# Patient Record
Sex: FEMALE | Race: BLACK OR AFRICAN AMERICAN | Employment: FULL TIME | URBAN - METROPOLITAN AREA
[De-identification: names, ages, dates, MRNs, and addresses within clinical notes are randomized per-mention and may not be internally consistent; named-entity substitution may affect disease eponyms.]

---

## 2020-07-22 ENCOUNTER — HOSPITAL ENCOUNTER (EMERGENCY)
Age: 50
Discharge: HOME | End: 2020-07-22
Payer: COMMERCIAL

## 2020-07-22 VITALS
BODY MASS INDEX: 23.32 KG/M2 | SYSTOLIC BLOOD PRESSURE: 144 MMHG | DIASTOLIC BLOOD PRESSURE: 102 MMHG | TEMPERATURE: 98.3 F | WEIGHT: 140 LBS | RESPIRATION RATE: 15 BRPM | HEIGHT: 65 IN | OXYGEN SATURATION: 100 % | HEART RATE: 80 BPM

## 2020-07-22 PROCEDURE — 99283 EMERGENCY DEPT VISIT LOW MDM: CPT

## 2020-07-23 NOTE — ED PROVIDER NOTES
file    Stress: Not on file   Relationships    Social connections     Talks on phone: Not on file     Gets together: Not on file     Attends [employer] service: Not on file     Active member of club or organization: Not on file     Attends meetings of clubs or organizations: Not on file     Relationship status: Not on file    Intimate partner violence     Fear of current or ex partner: Not on file     Emotionally abused: Not on file     Physically abused: Not on file     Forced sexual activity: Not on file   Other Topics Concern    Not on file   Social History Narrative    Not on file     No current facility-administered medications for this encounter. Current Outpatient Medications   Medication Sig Dispense Refill    lisinopril (PRINIVIL;ZESTRIL) 40 MG tablet Take 40 mg by mouth daily.  amlodipine (NORVASC) 5 MG tablet Take 10 mg by mouth daily. No Known Allergies    REVIEW OF SYSTEMS:  6 systems reviewed, pertinent positives per HPI otherwise noted to be negative. PHYSICAL EXAM:  BP (!) 144/102   Pulse 80   Temp 98.3 °F (36.8 °C) (Oral)   Resp 15   Ht 5' 5\" (1.651 m)   Wt 140 lb (63.5 kg)   SpO2 100%   BMI 23.30 kg/m²   CONSTITUTIONAL: Awake and alert. Well-developed. Well-nourished. Non-toxic. Cooperative. No acute distress. HENT: Normocephalic. Atraumatic. External ears normal, without discharge. Nose normal. Mucous membranes moist.  EYES: Conjunctiva non-injected. No scleral icterus. PERRL. EOM's grossly intact. NECK: Supple. Normal ROM. CARDIOVASCULAR: Normal heart rate. Intact distal pulses. PULMONARY/CHEST WALL: Breathing is unlabored. Equal, symmetric chest rise. Speaking comfortably in full sentences. ABDOMEN: Nondistended  MUSKULOSKELETAL: Normal ROM. No acute deformities. SKIN: Warm and dry. There are 2 small, very superficial bite wounds to the left distal pretibial region. Each wound is just under 1 cm in length. There is no bleeding.   There is no soft tissue swelling. No significant pain to palpate the wounds. No crepitus. See picture below. NEUROLOGICAL: Alert and oriented x 3. Strength is 5/5 in all extremities and sensation is intact. PSYCHIATRIC: Normal affect        Left distal pretibial region            Labs:    None    RADIOLOGY:    None        ED COURSE/MDM:  Patient was given the following medications: None      I have evaluated this patient here in the ED. this patient has 2 very superficial bite wounds to the left lower leg. There appears to be more superficial scratches or abrasions. There is no puncture wound. Wound is cleansed but I do not see an indication for oral antibiotics as this is such a superficial wound. I recommended the patient watch it closely and follow-up with primary care for wound check in 3 days. She is agreeable with plan of care. No tetanus vaccination required as she reports she is up-to-date. I estimate there is LOW risk for FRACTURE, COMPARTMENT SYNDROME, TENDON OR NEUROVASCULAR INJURY, or RETAINED FOREIGN BODY, thus I consider the discharge disposition reasonable. Also, there is no evidence or peritonitis, sepsis, or toxicity. Jean-Claude Faulkner and I have discussed the diagnosis and risks, and we agree with discharging home to follow-up with their primary doctor. We also discussed returning to the Emergency Department immediately if new or worsening symptoms occur. We have discussed the symptoms which are most concerning (e.g., changing or worsening pain, fever, numbness, weakness, cool or painful digits) that necessitate immediate return. CLINICAL IMPRESSION:  1. Dog bite, initial encounter        Blood pressure (!) 144/102, pulse 80, temperature 98.3 °F (36.8 °C), temperature source Oral, resp. rate 15, height 5' 5\" (1.651 m), weight 140 lb (63.5 kg), SpO2 100 %.           PATIENT REFERRED TO:  Lucía Ferrera MD  [facility] [id number] [phone]    Schedule an appointment as soon as possible for a visit in 3 days  For wound re-check        DISPOSITION  Patient was discharged to home in good condition.           Jailyn Mcclellan, [id number]  07/22/20 0392